# Patient Record
(demographics unavailable — no encounter records)

---

## 2025-05-22 NOTE — DISCUSSION/SUMMARY
[EKG obtained to assist in diagnosis and management of assessed problem(s)] : EKG obtained to assist in diagnosis and management of assessed problem(s) [FreeTextEntry1] : 82 yo man with PMHx PAD, HTN, DM2   Assessment: 1. HTN, DM2, PAD 2. Dyspnea w/ exertion    Plan: 1. ASA 81mg PO QD for PAD 2. Statin for HLD + DM2 3. C/w losartan 4. TTE  5. RTC 2-3 mo   During non face-to-face time, I reviewed relevant portions of the patients medical record. During face-to-face time, I took a relevant history and examined the patient. I also explained differential diagnoses, relevant cardiac diagnoses, workup, and management plan, which required a moderate level of medical decision making. I answered all questions related to the patient's medical conditions.   Verónica SMYTH (John)  of Cardiology [facility] [employer] [facility] at [facility]

## 2025-05-22 NOTE — DISCUSSION/SUMMARY
[EKG obtained to assist in diagnosis and management of assessed problem(s)] : EKG obtained to assist in diagnosis and management of assessed problem(s) [FreeTextEntry1] : 82 yo man with PMHx PAD, HTN, DM2   Assessment: 1. HTN, DM2, PAD 2. Dyspnea w/ exertion    Plan: 1. ASA 81mg PO QD for PAD 2. Statin for HLD + DM2 3. C/w losartan 4. TTE  5. RTC 2-3 mo   During non face-to-face time, I reviewed relevant portions of the patients medical record. During face-to-face time, I took a relevant history and examined the patient. I also explained differential diagnoses, relevant cardiac diagnoses, workup, and management plan, which required a moderate level of medical decision making. I answered all questions related to the patient's medical conditions.   Verónica SMYTH (John)  of Cardiology [facility] at [facility]